# Patient Record
Sex: FEMALE | Race: BLACK OR AFRICAN AMERICAN | Employment: FULL TIME | URBAN - METROPOLITAN AREA
[De-identification: names, ages, dates, MRNs, and addresses within clinical notes are randomized per-mention and may not be internally consistent; named-entity substitution may affect disease eponyms.]

---

## 2017-08-23 PROBLEM — R07.9 CHEST PAIN: Status: ACTIVE | Noted: 2017-08-23

## 2022-12-13 ENCOUNTER — ANESTHESIA (OUTPATIENT)
Dept: OPERATING ROOM | Age: 67
End: 2022-12-13
Payer: COMMERCIAL

## 2022-12-13 ENCOUNTER — HOSPITAL ENCOUNTER (EMERGENCY)
Age: 67
Discharge: HOME | End: 2022-12-14
Attending: EMERGENCY MEDICINE
Payer: COMMERCIAL

## 2022-12-13 ENCOUNTER — APPOINTMENT (OUTPATIENT)
Dept: CT IMAGING | Age: 67
End: 2022-12-13
Payer: COMMERCIAL

## 2022-12-13 ENCOUNTER — ANESTHESIA EVENT (OUTPATIENT)
Dept: OPERATING ROOM | Age: 67
End: 2022-12-13
Payer: COMMERCIAL

## 2022-12-13 ENCOUNTER — APPOINTMENT (OUTPATIENT)
Dept: GENERAL RADIOLOGY | Age: 67
End: 2022-12-13
Payer: COMMERCIAL

## 2022-12-13 DIAGNOSIS — T18.108A FOREIGN BODY IN ESOPHAGUS, INITIAL ENCOUNTER: Primary | ICD-10-CM

## 2022-12-13 PROCEDURE — 43247 EGD REMOVE FOREIGN BODY: CPT

## 2022-12-13 PROCEDURE — 2500000003 HC RX 250 WO HCPCS: Performed by: PHYSICIAN ASSISTANT

## 2022-12-13 PROCEDURE — 70360 X-RAY EXAM OF NECK: CPT

## 2022-12-13 PROCEDURE — 70490 CT SOFT TISSUE NECK W/O DYE: CPT

## 2022-12-13 PROCEDURE — 99285 EMERGENCY DEPT VISIT HI MDM: CPT

## 2022-12-13 RX ADMIN — GLUCAGON HYDROCHLORIDE 1 MG: KIT at 20:50

## 2022-12-13 ASSESSMENT — ENCOUNTER SYMPTOMS
EYES NEGATIVE: 1
TROUBLE SWALLOWING: 0
ABDOMINAL PAIN: 0
SHORTNESS OF BREATH: 0
DIARRHEA: 0
VOMITING: 0
SORE THROAT: 1
EYE REDNESS: 0
NAUSEA: 0

## 2022-12-13 NOTE — ED NOTES
Department of Emergency Medicine  FIRST PROVIDER TRIAGE NOTE             Independent MLP           12/13/22  2:37 PM EST    Date of Encounter: 12/13/22   MRN: [id number]      HPI: Young Lowery is a 74 y.o. female who presents to the ED for Foreign Body (States that she has a bone stuck in her throat. States throat is painful with swallow. )     Pt presenting for esophageal FB     ROS: Negative for cp or sob. PE: Gen Appearance/Constitutional: alert  HEENT: NC/NT. PERRLA,  Airway patent. Initial Plan of Care: All treatment areas with department are currently occupied. Plan to order/Initiate the following while awaiting opening in ED: imaging studies.   Initiate Treatment-Testing, Proceed toTreatment Area When Bed Available for ED Attending/MLP to Continue Care    Electronically signed by Jes Bhandari PA-C   DD: 12/13/22      Jes Bhandari PA-C  12/13/22 2501

## 2022-12-14 VITALS
RESPIRATION RATE: 20 BRPM | OXYGEN SATURATION: 95 % | TEMPERATURE: 97 F | DIASTOLIC BLOOD PRESSURE: 80 MMHG | SYSTOLIC BLOOD PRESSURE: 152 MMHG | HEART RATE: 80 BPM

## 2022-12-14 PROBLEM — T18.108A ESOPHAGEAL FOREIGN BODY: Status: ACTIVE | Noted: 2022-12-14

## 2022-12-14 PROCEDURE — 2709999900 HC NON-CHARGEABLE SUPPLY: Performed by: SURGERY

## 2022-12-14 PROCEDURE — 6360000002 HC RX W HCPCS: Performed by: NURSE ANESTHETIST, CERTIFIED REGISTERED

## 2022-12-14 PROCEDURE — 2580000003 HC RX 258: Performed by: NURSE ANESTHETIST, CERTIFIED REGISTERED

## 2022-12-14 PROCEDURE — 2500000003 HC RX 250 WO HCPCS: Performed by: NURSE ANESTHETIST, CERTIFIED REGISTERED

## 2022-12-14 PROCEDURE — 3600000002 HC SURGERY LEVEL 2 BASE: Performed by: SURGERY

## 2022-12-14 PROCEDURE — 7100000001 HC PACU RECOVERY - ADDTL 15 MIN: Performed by: SURGERY

## 2022-12-14 PROCEDURE — 3700000001 HC ADD 15 MINUTES (ANESTHESIA): Performed by: SURGERY

## 2022-12-14 PROCEDURE — 7100000011 HC PHASE II RECOVERY - ADDTL 15 MIN: Performed by: SURGERY

## 2022-12-14 PROCEDURE — 7100000000 HC PACU RECOVERY - FIRST 15 MIN: Performed by: SURGERY

## 2022-12-14 PROCEDURE — 43247 EGD REMOVE FOREIGN BODY: CPT | Performed by: SURGERY

## 2022-12-14 PROCEDURE — 3600000012 HC SURGERY LEVEL 2 ADDTL 15MIN: Performed by: SURGERY

## 2022-12-14 PROCEDURE — 99220 PR INITIAL OBSERVATION CARE/DAY 70 MINUTES: CPT | Performed by: SURGERY

## 2022-12-14 PROCEDURE — 3700000000 HC ANESTHESIA ATTENDED CARE: Performed by: SURGERY

## 2022-12-14 PROCEDURE — 7100000010 HC PHASE II RECOVERY - FIRST 15 MIN: Performed by: SURGERY

## 2022-12-14 RX ORDER — MIDAZOLAM HYDROCHLORIDE 1 MG/ML
INJECTION INTRAMUSCULAR; INTRAVENOUS PRN
Status: DISCONTINUED | OUTPATIENT
Start: 2022-12-14 | End: 2022-12-14 | Stop reason: SDUPTHER

## 2022-12-14 RX ORDER — ONDANSETRON 2 MG/ML
INJECTION INTRAMUSCULAR; INTRAVENOUS PRN
Status: DISCONTINUED | OUTPATIENT
Start: 2022-12-14 | End: 2022-12-14 | Stop reason: SDUPTHER

## 2022-12-14 RX ORDER — DEXAMETHASONE SODIUM PHOSPHATE 10 MG/ML
INJECTION INTRAMUSCULAR; INTRAVENOUS PRN
Status: DISCONTINUED | OUTPATIENT
Start: 2022-12-14 | End: 2022-12-14 | Stop reason: SDUPTHER

## 2022-12-14 RX ORDER — PROPOFOL 10 MG/ML
INJECTION, EMULSION INTRAVENOUS PRN
Status: DISCONTINUED | OUTPATIENT
Start: 2022-12-14 | End: 2022-12-14 | Stop reason: SDUPTHER

## 2022-12-14 RX ORDER — FENTANYL CITRATE 50 UG/ML
INJECTION, SOLUTION INTRAMUSCULAR; INTRAVENOUS PRN
Status: DISCONTINUED | OUTPATIENT
Start: 2022-12-14 | End: 2022-12-14 | Stop reason: SDUPTHER

## 2022-12-14 RX ORDER — SODIUM CHLORIDE 9 MG/ML
INJECTION, SOLUTION INTRAVENOUS CONTINUOUS PRN
Status: DISCONTINUED | OUTPATIENT
Start: 2022-12-14 | End: 2022-12-14 | Stop reason: SDUPTHER

## 2022-12-14 RX ORDER — LIDOCAINE HYDROCHLORIDE 20 MG/ML
INJECTION, SOLUTION INTRAVENOUS PRN
Status: DISCONTINUED | OUTPATIENT
Start: 2022-12-14 | End: 2022-12-14 | Stop reason: SDUPTHER

## 2022-12-14 RX ORDER — SUCCINYLCHOLINE/SOD CL,ISO/PF 200MG/10ML
SYRINGE (ML) INTRAVENOUS PRN
Status: DISCONTINUED | OUTPATIENT
Start: 2022-12-14 | End: 2022-12-14 | Stop reason: SDUPTHER

## 2022-12-14 RX ADMIN — LIDOCAINE HYDROCHLORIDE 100 MG: 20 INJECTION, SOLUTION INTRAVENOUS at 00:24

## 2022-12-14 RX ADMIN — MIDAZOLAM 2 MG: 1 INJECTION INTRAMUSCULAR; INTRAVENOUS at 00:21

## 2022-12-14 RX ADMIN — DEXAMETHASONE SODIUM PHOSPHATE 10 MG: 10 INJECTION INTRAMUSCULAR; INTRAVENOUS at 00:27

## 2022-12-14 RX ADMIN — PROPOFOL 150 MG: 10 INJECTION, EMULSION INTRAVENOUS at 00:24

## 2022-12-14 RX ADMIN — SODIUM CHLORIDE: 9 INJECTION, SOLUTION INTRAVENOUS at 00:15

## 2022-12-14 RX ADMIN — Medication 160 MG: at 00:24

## 2022-12-14 RX ADMIN — FENTANYL CITRATE 100 MCG: 50 INJECTION, SOLUTION INTRAMUSCULAR; INTRAVENOUS at 00:24

## 2022-12-14 RX ADMIN — ONDANSETRON HYDROCHLORIDE 4 MG: 2 SOLUTION INTRAMUSCULAR; INTRAVENOUS at 00:27

## 2022-12-14 ASSESSMENT — PAIN SCALES - GENERAL: PAINLEVEL_OUTOF10: 0

## 2022-12-14 NOTE — DISCHARGE INSTRUCTIONS
You had a food impaction which we fixed with esophagogastroduodenoscopy. You were incidentally found to have a hiatal hernia. We will discuss this with you at your follow up appointment. You should be on a clear liquid/pureed diet for the next day, then you can advance your diet to what you normally eat the day after. Come to the hospital for worsening neck pain, abdominal pain, fever greater than 101.5F or inability to tolerate liquids or food for greater than 24 hours.

## 2022-12-14 NOTE — PROGRESS NOTES
Discharge instructions reviewed with patient and family, including post anesthesia care at home. Patient verbalizes understanding, no questions regarding instructions.

## 2022-12-14 NOTE — ED PROVIDER NOTES
Cleo Lombard is a 74year old female presents the emergency department for foreign body that started 145 this afternoon. Complaint is persisting, moderate severity, nothing makes better or worse. She states that she was eating some chicken when she felt like something was stuck in her throat and is still scratchy at this time. She is able to swallow her secretions with no difficulty and no difficulty with breathing. She denies fever, chills, chest pain, shortness of breath, abdominal pain, nausea, vomiting, diarrhea. The history is provided by the patient. Review of Systems   Constitutional:  Negative for chills and fever. HENT:  Positive for sore throat. Negative for congestion and trouble swallowing. Eyes: Negative. Negative for redness. Respiratory:  Negative for shortness of breath. Cardiovascular:  Negative for chest pain. Gastrointestinal:  Negative for abdominal pain, diarrhea, nausea and vomiting. Genitourinary: Negative. Negative for dysuria and hematuria. Musculoskeletal:  Negative for neck pain and neck stiffness. Skin: Negative. Neurological:  Negative for dizziness, light-headedness and headaches. Psychiatric/Behavioral: Negative. Negative for agitation and behavioral problems. Physical Exam  Vitals and nursing note reviewed. Constitutional:       Appearance: Normal appearance. HENT:      Head: Normocephalic and atraumatic. Eyes:      Pupils: Pupils are equal, round, and reactive to light. Cardiovascular:      Rate and Rhythm: Normal rate and regular rhythm. Pulmonary:      Effort: Pulmonary effort is normal.      Breath sounds: No wheezing, rhonchi or rales. Abdominal:      Palpations: Abdomen is soft. Tenderness: There is no abdominal tenderness. There is no guarding or rebound. Musculoskeletal:      Cervical back: Normal range of motion. No rigidity. Skin:     General: Skin is warm and dry.       Capillary Refill: Capillary refill takes less than 2 seconds. Neurological:      General: No focal deficit present. Mental Status: She is alert and oriented to person, place, and time. Psychiatric:         Mood and Affect: Mood normal.         Behavior: Behavior normal.        Procedures     MDM  Number of Diagnoses or Management Options  Foreign body in esophagus, initial encounter  Diagnosis management comments: Patient presented emergency department for a foreign body that started 145 this afternoon. She can still feel something caught in her throat and says that she has no difficulty with swallowing or breathing. She has no cold secretions on physical exam.  Imaging shows that there is a 2 cm foreign body stuck in the esophagus at the C6-C7. We will consult general surgery to provide consultation, they are taking the patient to the OR for an EGD and will disposition the patient.       --------------------------------------------- PAST HISTORY ---------------------------------------------  Past Medical History:  has a past medical history of Hyperlipidemia and Hypertension. Past Surgical History:  has a past surgical history that includes Hysterectomy (04/2016). Social History:  reports that she has never smoked. She does not have any smokeless tobacco history on file. She reports that she does not drink alcohol and does not use drugs. Family History: family history includes Stroke in her father. The patients home medications have been reviewed. Allergies: Patient has no known allergies. -------------------------------------------------- RESULTS -------------------------------------------------    Lab  No results found for this visit on 12/13/22. Radiology  XR NECK SOFT TISSUE    Result Date: 12/13/2022  EXAMINATION: TWO XRAY VIEWS OF THE NECK SOFT TISSUES 12/13/2022 3:13 pm COMPARISON: None.  HISTORY: ORDERING SYSTEM PROVIDED HISTORY: chicken bone FB TECHNOLOGIST PROVIDED HISTORY: Reason for exam:->chicken bone FB What reading provider will be dictating this exam?->CRC FINDINGS: On the lateral view, tiny calcific density is seen projecting over the prevertebral soft tissues anterior to the C6-7 level. This is nonspecific but could represent tiny foreign body related to a bone. Otherwise, no radiopaque foreign body is seen. There is no evidence of prevertebral soft tissue thickening. The epiglottis appears within normal limits and there is no evidence of soft tissue mass. 1. Possible tiny radiopaque foreign body in the prevertebral soft tissues anterior to C6-7. 2. Otherwise, unremarkable soft tissue neck. CT SOFT TISSUE NECK WO CONTRAST    Result Date: 12/13/2022  EXAMINATION: CT OF THE NECK WITHOUT CONTRAST  12/13/2022 TECHNIQUE: CT of the neck was performed without the administration of intravenous contrast. Multiplanar reformatted images are provided for review. Automated exposure control, iterative reconstruction, and/or weight based adjustment of the mA/kV was utilized to reduce the radiation dose to as low as reasonably achievable. COMPARISON: None. HISTORY: ORDERING SYSTEM PROVIDED HISTORY: Foreign body TECHNOLOGIST PROVIDED HISTORY: Reason for exam:->Foreign body Decision Support Exception - unselect if not a suspected or confirmed emergency medical condition->Emergency Medical Condition (MA) What reading provider will be dictating this exam?->CRC FINDINGS: PHARYNX/LARYNX:  The airway is patent. There is a 2.4 cm linear radiopaque foreign body oriented transversely within the esophagus at the level of C6-7 (series 301, image 59). SALIVARY GLANDS/THYROID:  The parotid and submandibular glands appear unremarkable. The thyroid gland appears unremarkable. LYMPH NODES:  No cervical or supraclavicular lymphadenopathy is seen. SOFT TISSUES:  No appreciable soft tissue swelling or mass is seen. BRAIN/ORBITS/SINUSES:  The visualized portion of the intracranial contents appear unremarkable.   The visualized portion of the orbits, paranasal sinuses and mastoid air cells demonstrate no acute abnormality. LUNG APICES/SUPERIOR MEDIASTINUM:  No focal consolidation is seen within the visualized lung apices. No superior mediastinal lymphadenopathy or mass. The visualized portion of the trachea appears unremarkable. BONES:  Degenerative changes of the spine. 2.4 cm linear radiopaque foreign body within the esophagus at C6-7.           ------------------------- NURSING NOTES AND VITALS REVIEWED ---------------------------  Date / Time Roomed:  12/13/2022 10:24 PM  ED Bed Assignment:  ST01/ST-01    The nursing notes within the ED encounter and vital signs as below have been reviewed. Patient Vitals for the past 24 hrs:   BP Temp Pulse Resp SpO2   12/13/22 1429 (!) 195/108 -- -- 20 --   12/13/22 1418 -- 97.6 °F (36.4 °C) 98 -- 98 %       Oxygen Saturation Interpretation: Normal      ------------------------------------------ PROGRESS NOTES ------------------------------------------      I have spoken with the patient and discussed todays results, in addition to providing specific details for the plan of care and counseling regarding the diagnosis and prognosis. Their questions are answered at this time and they are agreeable with the plan.      --------------------------------- ADDITIONAL PROVIDER NOTES ---------------------------------  Consultations:  Spoke with Surgical resident,  They will provide consultation. This patient's ED course included: a personal history and physicial examination and re-evaluation prior to disposition    This patient has remained unchanged during their ED course. Clinical Impression  1. Foreign body in esophagus, initial encounter          Disposition  Patient's disposition: per consultation  Patient's condition is serious. ED Course as of 12/14/22 0010   Tue Dec 13, 2022   2305 Surgical resident emergency department to evaluate the patient provide consultation.  [DT]      ED Course User Index  [DT] Bebe Mcburney, DO Bebe Mcburney, DO  Resident  12/14/22 0006  ATTENDING PROVIDER ATTESTATION:     I have personally performed and/or participated in the history, exam, medical decision making, and procedures and agree with all pertinent clinical information. I have also reviewed and agree with the past medical, family and social history unless otherwise noted. I have discussed this patient in detail with the resident, and provided the instruction and education regarding foreign body. My findings/Plan: I was the primary provider for patient. Patient presenting here because of foreign body sensation in her throat. Patient reports she was eating chicken and believes there is something stuck in her throat. Patient reporting pain with swallowing. Patient reports she is able to drink. Patient reporting no chest pain or difficulty breathing she reports no fever or chills. Patient reports no prior symptoms like this in the past.  Patient reports symptoms started around 140 pm.  Patient here is awake alert. Heart and lung exam normal.  Posterior pharynx is clear patient is tender mainly to her left lateral anterior neck. Patient did undergo plain films of her throat and concerning for foreign body. CT of her soft tissue done and noted linear foreign body around C6-C7. In the esophagus. Patient was made aware of findings we did consult general surgery they did evaluate the patient. Patient will be going to the OR for EGD.   Patient was made aware of findings and plan       Asim Agarwal MD  12/14/22 1684       Asim Agarwal MD  12/14/22 Crow Ruffin MD  12/14/22 0010

## 2022-12-14 NOTE — CONSULTS
GENERAL SURGERY  CONSULT NOTE  12/13/2022    Physician Consulted: Dr. Bairon Carter   Reason for Consult: Esophageal foreign body  Referring Physician: Dr. Rob Cobian is a 74 y.o. female with a history of hypertension/hyperlipidemia who presents for evaluation of something stuck in her throat. Patient states that while she was eating at approximately 1 PM she inadvertently swallowed a chicken bone. Patient states that the chicken bone became stuck midway down her throat. She states she has had a significant amount of throat pain associated with this since that time. Patient states attempting to swallow makes it worse. She states she has been tolerating her secretions. Patient denies any difficulty breathing or shortness of breath. Patient denies any chest pain, abdominal pain, or nausea/vomiting. Initial evaluation with x-ray of the neck demonstrated a subtle soft tissue density in the mid cervical region. Patient subsequently underwent CT of the neck without contrast which demonstrated a 2.5 cm foreign body oriented horizontally near her C6 vertebrae in the esophagus. Patient denies any prior surgical procedure. Patient denies any history of GERD. Patient has any prior EGD/colonoscopies. Patient denies any prior esophageal food impactions. Past Medical History:   Diagnosis Date    Hyperlipidemia     Hypertension        Past Surgical History:   Procedure Laterality Date    HYSTERECTOMY (CERVIX STATUS UNKNOWN)  04/2016       Medications Prior to Admission:    Prior to Admission medications    Medication Sig Start Date End Date Taking?  Authorizing Provider   amLODIPine (NORVASC) 5 MG tablet Take 5 mg by mouth daily    Historical Provider, MD   atorvastatin (LIPITOR) 10 MG tablet Take 10 mg by mouth daily    Historical Provider, MD       No Known Allergies    Family History   Problem Relation Age of Onset    Stroke Father        Social History     Tobacco Use    Smoking status: Never Substance Use Topics    Alcohol use: No    Drug use: No         Review of Systems reviewed and otherwise negative unless noted in HPI      PHYSICAL EXAM:    Vitals:    12/13/22 1429   BP: (!) 195/108   Pulse:    Resp: 20   Temp:    SpO2:        General Appearance:  awake, alert, oriented, in no acute distress  Skin:  Skin color, texture, turgor normal. No rashes or lesions. Head/face:  NCAT  Eyes:  No gross abnormalities. , PERRL, EOMI, and Sclera nonicteric  Lungs:  Normal expansion. Clear to auscultation. No rales, rhonchi, or wheezing. Heart:  Heart regular rate and rhythm  Abdomen:  Soft, non-tender, normal bowel sounds. No bruits, organomegaly or masses. Extremities: Extremities warm to touch, pink, with no edema. LABS:    CBC  No results for input(s): WBC, HGB, HCT, PLT in the last 72 hours. BMP  No results for input(s): NA, K, CL, CO2, BUN, CREATININE, GLU, CALCIUM in the last 72 hours. Liver Function  No results for input(s): AMYLASE, LIPASE, BILITOT, BILIDIR, AST, ALT, ALKPHOS, PROT, LABALBU in the last 72 hours. No results for input(s): LACTATE in the last 72 hours. No results for input(s): INR, PTT in the last 72 hours. Invalid input(s): PT    RADIOLOGY    XR NECK SOFT TISSUE    Result Date: 12/13/2022  EXAMINATION: TWO XRAY VIEWS OF THE NECK SOFT TISSUES 12/13/2022 3:13 pm COMPARISON: None. HISTORY: ORDERING SYSTEM PROVIDED HISTORY: chicken bone FB TECHNOLOGIST PROVIDED HISTORY: Reason for exam:->chicken bone FB What reading provider will be dictating this exam?->CRC FINDINGS: On the lateral view, tiny calcific density is seen projecting over the prevertebral soft tissues anterior to the C6-7 level. This is nonspecific but could represent tiny foreign body related to a bone. Otherwise, no radiopaque foreign body is seen. There is no evidence of prevertebral soft tissue thickening. The epiglottis appears within normal limits and there is no evidence of soft tissue mass.      1. Possible tiny radiopaque foreign body in the prevertebral soft tissues anterior to C6-7. 2. Otherwise, unremarkable soft tissue neck. CT SOFT TISSUE NECK WO CONTRAST    Result Date: 12/13/2022  EXAMINATION: CT OF THE NECK WITHOUT CONTRAST  12/13/2022 TECHNIQUE: CT of the neck was performed without the administration of intravenous contrast. Multiplanar reformatted images are provided for review. Automated exposure control, iterative reconstruction, and/or weight based adjustment of the mA/kV was utilized to reduce the radiation dose to as low as reasonably achievable. COMPARISON: None. HISTORY: ORDERING SYSTEM PROVIDED HISTORY: Foreign body TECHNOLOGIST PROVIDED HISTORY: Reason for exam:->Foreign body Decision Support Exception - unselect if not a suspected or confirmed emergency medical condition->Emergency Medical Condition (MA) What reading provider will be dictating this exam?->CRC FINDINGS: PHARYNX/LARYNX:  The airway is patent. There is a 2.4 cm linear radiopaque foreign body oriented transversely within the esophagus at the level of C6-7 (series 301, image 59). SALIVARY GLANDS/THYROID:  The parotid and submandibular glands appear unremarkable. The thyroid gland appears unremarkable. LYMPH NODES:  No cervical or supraclavicular lymphadenopathy is seen. SOFT TISSUES:  No appreciable soft tissue swelling or mass is seen. BRAIN/ORBITS/SINUSES:  The visualized portion of the intracranial contents appear unremarkable. The visualized portion of the orbits, paranasal sinuses and mastoid air cells demonstrate no acute abnormality. LUNG APICES/SUPERIOR MEDIASTINUM:  No focal consolidation is seen within the visualized lung apices. No superior mediastinal lymphadenopathy or mass. The visualized portion of the trachea appears unremarkable. BONES:  Degenerative changes of the spine. 2.4 cm linear radiopaque foreign body within the esophagus at C6-7.          ASSESSMENT:  74 y.o. female with esophageal foreign body currently at cervical esophagus approximately C6 on prior imaging. PLAN:  -Plan for EGD with foreign body removal 12/13 with Dr. Phi Brown  -Discussed risks/benefits with the patient at bedside and patient agrees to proceed.   - Dispo pending results of EGD  - Discussed with Dr. Phi Brown    Electronically signed by Abbie Hernandez DO on 12/13/22 at 11:17 PM EST

## 2022-12-14 NOTE — ANESTHESIA POSTPROCEDURE EVALUATION
Department of Anesthesiology  Postprocedure Note    Patient: Pierre Kelly  MRN: [id number]  YOB: 1955  Date of evaluation: 12/14/2022      Procedure Summary     Date: 12/14/22 Room / Location: [facility] / [facility]    Anesthesia Start: 0015 Anesthesia Stop: 3470    Procedure: EGD ESOPHAGOGASTRODUODENOSCOPY, FOREIGN BODY REMOVAL (Mouth) Diagnosis:       Aspiration of foreign body, initial encounter      (foreign body)    Surgeons: Ron Lucas MD Responsible Provider: Yadi Cheek MD    Anesthesia Type: general ASA Status: 2 - Emergent          Anesthesia Type: No value filed.     Mariana Phase I: Mariana Score: 10    Mariana Phase II: Mariana Score: 10      Anesthesia Post Evaluation    Patient location during evaluation: PACU  Patient participation: complete - patient participated  Level of consciousness: awake  Pain score: 3  Airway patency: patent  Nausea & Vomiting: no nausea and no vomiting  Complications: no  Cardiovascular status: blood pressure returned to baseline  Respiratory status: acceptable  Hydration status: euvolemic

## 2022-12-14 NOTE — OP NOTE
Operative Note      Patient: Amos Johnson  YOB: 1955  MRN: [id number]    Date of Procedure: 12/13/2022    Pre-Op Diagnosis: foreign body    Post-Op Diagnosis:  esophageal foreign body, small hiatal hernia       Procedure(s):  EGD ESOPHAGOGASTRODUODENOSCOPY, FOREIGN BODY REMOVAL    Surgeon(s):  Evelin Subramanian MD    Assistant:   Resident: Claudine Miles MD; Marshal Beverage DO    Anesthesia: Monitor Anesthesia Care    Estimated Blood Loss (mL): Minimal    Complications: None    Specimens:   * No specimens in log *    Implants:  * No implants in log *      Drains: * No LDAs found *    Findings: chicken bone passed from esophagus into the stomach; small hiatal hernia    Detailed Description of Procedure: The patient was brought to the OR and placed in the left lateral decubitus position. A bite block was placed in the patients mouth. After the initiation of [facility] anesthesia, a gastroscope was inserted into the patient's mouth and passed into the esophagus. There was a chicken bone positioned transversely just distal to the larynx. This was pushed into the stomach. There were retained food stuffs in the stomach. The visualized portions of the stomach, pylorus, duodenum were all normal. The scope was withdrawn into the body of the stomach and retroflexed. A small hiatal hernia was noted. The scope was straightened and the esophagus was inspected. There was a small erosion at the proximal esophagus without ulceration or bleeding. The scope was completely withdrawn from the oropharynx. The patient was awoken from anesthesia and brought to the PACU in stable condition.           Electronically signed by Jori Wolfe MD on 12/14/2022 at 12:44 AM

## 2022-12-14 NOTE — ANESTHESIA PRE PROCEDURE
Department of Anesthesiology  Preprocedure Note       Name:  Keira Carias   Age:  74 y.o.  :  1955                                          MRN:  [id number]         Date:  2022      Surgeon: Ever Dalton):  Marylin Landau, MD    Procedure: Procedure(s):  EGD ESOPHAGOGASTRODUODENOSCOPY, FOREIGN BODY REMOVAL    Medications prior to admission:   Prior to Admission medications    Medication Sig Start Date End Date Taking? Authorizing Provider   amLODIPine (NORVASC) 5 MG tablet Take 5 mg by mouth daily    Historical Provider, MD   atorvastatin (LIPITOR) 10 MG tablet Take 10 mg by mouth daily    Historical Provider, MD       Current medications:    No current facility-administered medications for this encounter. Current Outpatient Medications   Medication Sig Dispense Refill    amLODIPine (NORVASC) 5 MG tablet Take 5 mg by mouth daily      atorvastatin (LIPITOR) 10 MG tablet Take 10 mg by mouth daily         Allergies:  No Known Allergies    Problem List:    Patient Active Problem List   Diagnosis Code    Chest pain R07.9       Past Medical History:        Diagnosis Date    Hyperlipidemia     Hypertension        Past Surgical History:        Procedure Laterality Date    HYSTERECTOMY (CERVIX STATUS UNKNOWN)  2016       Social History:    Social History     Tobacco Use    Smoking status: Never    Smokeless tobacco: Not on file   Substance Use Topics    Alcohol use:  No                                Counseling given: Not Answered      Vital Signs (Current):   Vitals:    22 1418 22 1429   BP:  (!) 195/108   Pulse: 98    Resp:  20   Temp: 36.4 °C (97.6 °F)    SpO2: 98%                                               BP Readings from Last 3 Encounters:   22 (!) 195/108   17 132/80   16 128/80       NPO Status:  per pt *hours                                                                               BMI:   Wt Readings from Last 3 Encounters:   17 158 lb 9.6 oz (71.9 kg)   06/24/16 155 lb (70.3 kg)     There is no height or weight on file to calculate BMI.    CBC:   Lab Results   Component Value Date/Time    WBC 7.7 12/27/2016 10:20 AM    RBC 5.24 12/27/2016 10:20 AM    HGB 13.9 12/27/2016 10:20 AM    HCT 43.0 12/27/2016 10:20 AM    MCV 82.1 12/27/2016 10:20 AM    RDW 13.9 12/27/2016 10:20 AM     12/27/2016 10:20 AM       CMP:   Lab Results   Component Value Date/Time     12/27/2016 10:20 AM    K 4.5 12/27/2016 10:20 AM     12/27/2016 10:20 AM    CO2 25 12/27/2016 10:20 AM    BUN 14 12/27/2016 10:20 AM    CREATININE 0.8 12/27/2016 10:20 AM    GFRAA >60 12/27/2016 10:20 AM    LABGLOM >60 12/27/2016 10:20 AM    GLUCOSE 93 12/27/2016 10:20 AM    PROT 8.6 12/27/2016 10:20 AM    CALCIUM 10.2 12/27/2016 10:20 AM    BILITOT 0.5 12/27/2016 10:20 AM    ALKPHOS 139 12/27/2016 10:20 AM    AST 25 12/27/2016 10:20 AM    ALT 33 12/27/2016 10:20 AM       POC Tests: No results for input(s): POCGLU, POCNA, POCK, POCCL, POCBUN, POCHEMO, POCHCT in the last 72 hours.     Coags: No results found for: PROTIME, INR, APTT    HCG (If Applicable): No results found for: PREGTESTUR, PREGSERUM, HCG, HCGQUANT     ABGs: No results found for: PHART, PO2ART, JZB4RHB, FWN4SKA, BEART, U2WGEDER     Type & Screen (If Applicable):  No results found for: LABABO, LABRH    Drug/Infectious Status (If Applicable):  No results found for: HIV, HEPCAB    COVID-19 Screening (If Applicable): No results found for: COVID19        Anesthesia Evaluation  Patient summary reviewed and Nursing notes reviewed no history of anesthetic complications:   Airway: Mallampati: II  TM distance: >3 FB   Neck ROM: full  Mouth opening: > = 3 FB   Dental:      Comment:  Pt denies any loose chipped or missing teeth    Pulmonary: breath sounds clear to auscultation                             Cardiovascular:    (+) hypertension:, hyperlipidemia        Rhythm: regular  Rate: normal                    Neuro/Psych: GI/Hepatic/Renal:             Endo/Other:                      ROS comment: Esophageal foreign body removal ([place] wing) Abdominal:             Vascular: Other Findings:           Anesthesia Plan      general     ASA 2 - emergent     (Pt with prior WNL labs from 11/29/22, taken from care everywhere. )  Induction: intravenous and rapid sequence. MIPS: Prophylactic antiemetics administered and Postoperative trial extubation. Anesthetic plan and risks discussed with patient. Plan discussed with attending.                     Carmelo Hanson, APRN - CRNA   12/14/2022

## (undated) DEVICE — Z DISCONTINUED NO SUB IDED TUBING ETCO2 AD L6.5FT NSL ORAL CVD PRNG NONFLARED TIP OVR

## (undated) DEVICE — BITEBLOCK 54FR W/ DENT RIM BLOX

## (undated) DEVICE — SPONGE GZ W4XL4IN RAYON POLY FILL CVR W/ NONWOVEN FAB

## (undated) DEVICE — DEFENDO AIR WATER SUCTION AND BIOPSY VALVE KIT FOR  OLYMPUS: Brand: DEFENDO AIR/WATER/SUCTION AND BIOPSY VALVE